# Patient Record
Sex: FEMALE | Race: OTHER | NOT HISPANIC OR LATINO | URBAN - METROPOLITAN AREA
[De-identification: names, ages, dates, MRNs, and addresses within clinical notes are randomized per-mention and may not be internally consistent; named-entity substitution may affect disease eponyms.]

---

## 2018-06-12 ENCOUNTER — EMERGENCY (EMERGENCY)
Facility: HOSPITAL | Age: 43
LOS: 1 days | Discharge: HOME | End: 2018-06-12
Attending: EMERGENCY MEDICINE
Payer: SELF-PAY

## 2018-06-12 VITALS
DIASTOLIC BLOOD PRESSURE: 101 MMHG | HEART RATE: 71 BPM | RESPIRATION RATE: 18 BRPM | OXYGEN SATURATION: 100 % | SYSTOLIC BLOOD PRESSURE: 153 MMHG | TEMPERATURE: 99 F

## 2018-06-12 PROCEDURE — 99284 EMERGENCY DEPT VISIT MOD MDM: CPT

## 2018-06-12 PROCEDURE — 71046 X-RAY EXAM CHEST 2 VIEWS: CPT

## 2018-06-12 PROCEDURE — 73030 X-RAY EXAM OF SHOULDER: CPT | Mod: 26,RT

## 2018-06-12 PROCEDURE — 73020 X-RAY EXAM OF SHOULDER: CPT

## 2018-06-12 PROCEDURE — 73030 X-RAY EXAM OF SHOULDER: CPT

## 2018-06-12 PROCEDURE — 71046 X-RAY EXAM CHEST 2 VIEWS: CPT | Mod: 26

## 2018-06-12 PROCEDURE — 99283 EMERGENCY DEPT VISIT LOW MDM: CPT

## 2018-06-12 RX ORDER — IBUPROFEN 200 MG
600 TABLET ORAL ONCE
Qty: 0 | Refills: 0 | Status: COMPLETED | OUTPATIENT
Start: 2018-06-12 | End: 2018-06-12

## 2018-06-12 RX ADMIN — Medication 600 MILLIGRAM(S): at 15:40
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
## 2018-06-12 NOTE — ED PROVIDER NOTE - PHYSICAL EXAMINATION
CONSTITUTIONAL: Patient is awake, alert and oriented x 3. Patient is well appearing and in no acute distress.  HEAD: NCAT,   EYES: PERRL b/l, EOMI,   ENT: Airway patent, Nasal mucosa clear. Mouth with normal mucosa. Throat has no vesicles, no oropharyngeal exudates and uvula is midline.  LUNGS: CTA B/L,  HEART: RRR.+S1S2 no murmurs,   MSK: right sided paracervical ttp and right trapezius ttp as well as right anterior shoulder ttp. No obvious deformity. There is no midline cervical, thoracic or lumbar ttp. no edema or calf tenderness b/l, FROM of the neck upper and lower ext b/l  SKIN: with no rash or lesions.   NEURO: Cn3-12 grossly intact. Strength5/5UE/LE.NmlSensation.Gait normal.

## 2018-06-12 NOTE — ED PROVIDER NOTE - CARE PLAN
Principal Discharge DX:	Acute pain of left shoulder Principal Discharge DX:	Acute pain of left shoulder  Assessment and plan of treatment:	1. Follow up with your doctor in the next few days  2. Take Tylenol and Motrin for pain  3. Return to ED for any new or worsened symptoms

## 2018-06-12 NOTE — ED PROVIDER NOTE - PLAN OF CARE
1. Follow up with your doctor in the next few days  2. Take Tylenol and Motrin for pain  3. Return to ED for any new or worsened symptoms

## 2024-12-04 NOTE — ED ADULT TRIAGE NOTE - SPO2 (%)
Chest pain to left chest since Monday. Seen in PCP office, had Trops 18 > 14. Endorses intermittent SOB and \"chest pressure\"     PMH: none   
100